# Patient Record
Sex: FEMALE | Race: WHITE
[De-identification: names, ages, dates, MRNs, and addresses within clinical notes are randomized per-mention and may not be internally consistent; named-entity substitution may affect disease eponyms.]

---

## 2019-07-12 ENCOUNTER — HOSPITAL ENCOUNTER (OUTPATIENT)
Dept: HOSPITAL 89 - OB | Age: 19
Setting detail: OBSERVATION
LOS: 1 days | Discharge: HOME | End: 2019-07-13
Attending: OBSTETRICS & GYNECOLOGY | Admitting: OBSTETRICS & GYNECOLOGY
Payer: SELF-PAY

## 2019-07-12 VITALS
WEIGHT: 250 LBS | HEIGHT: 62 IN | BODY MASS INDEX: 46.01 KG/M2 | BODY MASS INDEX: 46.01 KG/M2 | WEIGHT: 250 LBS | HEIGHT: 62 IN

## 2019-07-12 VITALS — DIASTOLIC BLOOD PRESSURE: 63 MMHG | SYSTOLIC BLOOD PRESSURE: 131 MMHG

## 2019-07-12 DIAGNOSIS — O36.8130: ICD-10-CM

## 2019-07-12 DIAGNOSIS — O47.03: Primary | ICD-10-CM

## 2019-07-12 DIAGNOSIS — Z3A.36: ICD-10-CM

## 2019-07-12 PROCEDURE — 96372 THER/PROPH/DIAG INJ SC/IM: CPT

## 2019-07-12 PROCEDURE — 81001 URINALYSIS AUTO W/SCOPE: CPT

## 2019-07-12 NOTE — HISTORY & PHYSICAL
History of Present Illness


Age of Patient:  18


:  2


Para or TPAL:  1


EDC per LMP:  Aug 6, 2019


EDC per U/S:  Aug 6, 2019


Estimated Gestational Age:  36.2


Chief Complaint


Abdominal pain, dizziness, decreased fetal movement.


History of Present Illness


, h/o term PLTCS due to second stage arrest in 2017.  She has an 18 month 


old daughter.  She recently moved her from [place], vague reason for moving 


here. Pt states she is living in a motel for now. Her  is working as a 


.  PT reports pregnancy has been healthy with no problems.  She is 


planning on repeat c/s.  She denies LOF, CTX or VB.





History


Patient's Blood Type:  A Positive


Rubella Status:  Immune


Group B Strep Screen:  Positive


Past Medical History:


Heart murmur


Allergies:  


Coded Allergies:  


     No Known Drug Allergies (Unverified , 19)


Social History:  


no tobacco, etoh or illicits, unemployed. Recently moved to [facility]


Home Meds


Reported Medications


Prenatal Vits W-Ca,Fe,Fa(<1MG) (PRENATAL VITAMINS) 1 Each Tablet, 1 EACH PO 


DAILY, TAB


   19





Review of Systems


Constitutional:  Weight Gain


Neurological:  Weakness, Dizziness


Eyes:  No Vision Change


Cardiovascular:  No Chest Pain, No Palpitations


Respiratory:  No Shortness of Breath, No Cough


Gastrointestinal:  No Nausea, No Vomiting; Abdominal Pain (RUQ)


Genitourinary:  No Dysuria


Psychiatric:  No Depression, No Anxiety





Exam


General Exam


Vital Signs





Vital Signs








  Date Time  Temp Pulse Resp B/P (MAP) Pulse Ox O2 Delivery O2 Flow Rate FiO2


 


19 17:30 98.3 92 22 131/63 (85) 96 Room Air  








General Apperance:  Alert/Awake/No Acute Distress


Neuro:  No Gross deficits


Eyes:  Normal Extraocular Movement & Vison


ENT:  Normal


Cardiovascular:  Regular Rate and Rhythm


Respiratory:  No Respiratory Distress, Clear to Auscultation


Abdomen:  Soft, Non-Tender, Non-Distended, Gravid - Non-Tender


Musculoskeletal:  No Weakness/Pain


Extremities:  No Cyanosis,Clubbing or Edema


Integumentary:  Skin Intact without Lesions or Rash


Psychological:  Alert & Oriented X3, Appropriate Mood & Affect





Pregnancy


Cervical Dialation:  1


Cervical Effacement (%):  50


Cervical Consistency:  Moderate


Cervical Position:  Posterior


Fetal Station:  -3


Fetal Presentation:  Vertex (by u/s)


Uterine Contraction Strength:  Mild


UC Resting Tone:  Soft





Fetus


Feeling Fetal Movement?:  Yes


Fetal Heart Tones:  135


Fetal Heart Tone Variabilty:  Moderate


FHT Accelerations:  Present


FHT Decelerations:  None


FHT Category:  I





Assessment and Plan


OB/GYN Assessment:  Stable


OB/GYN Plan:  Discharge Home Tomorrow


Problems:  


(1)  uterine contractions in third trimester, antepartum


Status:  Acute


Assessment & Plan:  Will do continuous fetal monitoring overnight due to ctx.  


Pt rec'd one dose of BMZ so far and will plan second dose tomorrow.  Pt not 


feeling ctx at this time and cervix is unchanged.  No tocolytics at this time.  


Will re-examine SVE in AM and if no change will d/c to home. 





(2) H/O: 


Status:  Chronic


Assessment & Plan:  Plan RCS at 39 weeks.  Pt will see me in office on Monday at


1430, will schedule surgery at that time. 














PETER COPELAND DO         2019 21:03

## 2019-07-13 ENCOUNTER — HOSPITAL ENCOUNTER (OUTPATIENT)
Dept: HOSPITAL 89 - LAB | Age: 19
End: 2019-07-13
Attending: OBSTETRICS & GYNECOLOGY
Payer: SELF-PAY

## 2019-07-13 VITALS — BODY MASS INDEX: 45.72 KG/M2

## 2019-07-13 DIAGNOSIS — Z3A.36: ICD-10-CM

## 2019-07-13 DIAGNOSIS — O47.03: Primary | ICD-10-CM

## 2019-07-13 NOTE — NUR
Pt here for repeat Betamethasone steroid shot. Pt affirms fetal movement, denies regular or 
painful UC's, denies signs of SROM or bleeding. Pt denies any pre-eclamptic symptoms. Denies 
any further concerns. Questions addressed.

-MIMI Ramsey RN

## 2019-07-13 NOTE — OB/GYN DISCHARGE SUMMARY
Discharge Summary


Reason for Hosp/Final Diag:  


(1)  uterine contractions in third trimester, antepartum


Status:  Acute


Hospital Course & Plan:  Ctx have spaced out overnight with IV hydration. Pt 


does not feel ctx and SVE is unchanged. PT to follow up in office in Monday for 


ob check. We will schedule her RCS at that time. 





(2) H/O: 


Status:  Chronic


Hospital Course & Plan:  Plan to repeat c/s at 39 weeks 





Lates Vital Signs





Vital Signs








  Date Time  Temp Pulse Resp B/P (MAP) Pulse Ox O2 Delivery O2 Flow Rate FiO2


 


19 17:30 98.3 92 22 131/63 (85) 96 Room Air  








Weight (Pounds):  250


Condition:  Improved


Discharge:  Home, Self Care


Home Meds


Reported Medications


Prenatal Vits W-Ca,Fe,Fa(<1MG) (PRENATAL VITAMINS) 1 Each Tablet, 1 EACH PO 


DAILY, TAB


   19


Follow up with:  [facility] [phone] (Pt has appt on Monday with Dr. Lopez at 1430)


Discharge Diet:  As Tolerates, Increase Fluid Intake


Discharge Activity:  As Tolerates











PETER LOPEZ DO         2019 10:05

## 2019-07-15 ENCOUNTER — HOSPITAL ENCOUNTER (OUTPATIENT)
Dept: HOSPITAL 89 - L&D | Age: 19
Discharge: HOME | End: 2019-07-15
Attending: OBSTETRICS & GYNECOLOGY
Payer: SELF-PAY

## 2019-07-15 VITALS — BODY MASS INDEX: 45.72 KG/M2

## 2019-07-15 DIAGNOSIS — O47.03: Primary | ICD-10-CM

## 2019-07-15 DIAGNOSIS — Z3A.36: ICD-10-CM

## 2019-07-15 PROCEDURE — 81001 URINALYSIS AUTO W/SCOPE: CPT

## 2019-07-15 PROCEDURE — 99213 OFFICE O/P EST LOW 20 MIN: CPT

## 2019-07-19 ENCOUNTER — HOSPITAL ENCOUNTER (INPATIENT)
Dept: HOSPITAL 89 - OB | Age: 19
LOS: 2 days | Discharge: HOME | End: 2019-07-21
Attending: OBSTETRICS & GYNECOLOGY | Admitting: OBSTETRICS & GYNECOLOGY
Payer: SELF-PAY

## 2019-07-19 VITALS — SYSTOLIC BLOOD PRESSURE: 109 MMHG | DIASTOLIC BLOOD PRESSURE: 84 MMHG

## 2019-07-19 VITALS — DIASTOLIC BLOOD PRESSURE: 48 MMHG | SYSTOLIC BLOOD PRESSURE: 111 MMHG

## 2019-07-19 VITALS — DIASTOLIC BLOOD PRESSURE: 51 MMHG | SYSTOLIC BLOOD PRESSURE: 107 MMHG

## 2019-07-19 VITALS — SYSTOLIC BLOOD PRESSURE: 128 MMHG | DIASTOLIC BLOOD PRESSURE: 104 MMHG

## 2019-07-19 VITALS — SYSTOLIC BLOOD PRESSURE: 115 MMHG | DIASTOLIC BLOOD PRESSURE: 91 MMHG

## 2019-07-19 VITALS — SYSTOLIC BLOOD PRESSURE: 121 MMHG | DIASTOLIC BLOOD PRESSURE: 71 MMHG

## 2019-07-19 VITALS — SYSTOLIC BLOOD PRESSURE: 112 MMHG | DIASTOLIC BLOOD PRESSURE: 69 MMHG

## 2019-07-19 VITALS — SYSTOLIC BLOOD PRESSURE: 100 MMHG | DIASTOLIC BLOOD PRESSURE: 54 MMHG

## 2019-07-19 VITALS — SYSTOLIC BLOOD PRESSURE: 98 MMHG | DIASTOLIC BLOOD PRESSURE: 52 MMHG

## 2019-07-19 VITALS — DIASTOLIC BLOOD PRESSURE: 63 MMHG | SYSTOLIC BLOOD PRESSURE: 106 MMHG

## 2019-07-19 VITALS — SYSTOLIC BLOOD PRESSURE: 95 MMHG | DIASTOLIC BLOOD PRESSURE: 54 MMHG

## 2019-07-19 VITALS — SYSTOLIC BLOOD PRESSURE: 109 MMHG | DIASTOLIC BLOOD PRESSURE: 45 MMHG

## 2019-07-19 VITALS — SYSTOLIC BLOOD PRESSURE: 93 MMHG | DIASTOLIC BLOOD PRESSURE: 81 MMHG

## 2019-07-19 VITALS — BODY MASS INDEX: 45.72 KG/M2 | HEIGHT: 61 IN

## 2019-07-19 VITALS — SYSTOLIC BLOOD PRESSURE: 102 MMHG | DIASTOLIC BLOOD PRESSURE: 60 MMHG

## 2019-07-19 VITALS — DIASTOLIC BLOOD PRESSURE: 50 MMHG | SYSTOLIC BLOOD PRESSURE: 100 MMHG

## 2019-07-19 VITALS — DIASTOLIC BLOOD PRESSURE: 60 MMHG | SYSTOLIC BLOOD PRESSURE: 105 MMHG

## 2019-07-19 VITALS — SYSTOLIC BLOOD PRESSURE: 98 MMHG | DIASTOLIC BLOOD PRESSURE: 57 MMHG

## 2019-07-19 VITALS — DIASTOLIC BLOOD PRESSURE: 53 MMHG | SYSTOLIC BLOOD PRESSURE: 103 MMHG

## 2019-07-19 DIAGNOSIS — O34.211: Primary | ICD-10-CM

## 2019-07-19 DIAGNOSIS — E66.9: ICD-10-CM

## 2019-07-19 DIAGNOSIS — Z3A.37: ICD-10-CM

## 2019-07-19 LAB
PLATELET COUNT, AUTOMATED: 210 K/UL (ref 150–450)
PLATELET COUNT, AUTOMATED: 236 K/UL (ref 150–450)

## 2019-07-19 PROCEDURE — 82947 ASSAY GLUCOSE BLOOD QUANT: CPT

## 2019-07-19 PROCEDURE — 85025 COMPLETE CBC W/AUTO DIFF WBC: CPT

## 2019-07-19 PROCEDURE — 84520 ASSAY OF UREA NITROGEN: CPT

## 2019-07-19 PROCEDURE — 82565 ASSAY OF CREATININE: CPT

## 2019-07-19 PROCEDURE — 86900 BLOOD TYPING SEROLOGIC ABO: CPT

## 2019-07-19 PROCEDURE — 36415 COLL VENOUS BLD VENIPUNCTURE: CPT

## 2019-07-19 PROCEDURE — 86901 BLOOD TYPING SEROLOGIC RH(D): CPT

## 2019-07-19 PROCEDURE — 82310 ASSAY OF CALCIUM: CPT

## 2019-07-19 PROCEDURE — 82435 ASSAY OF BLOOD CHLORIDE: CPT

## 2019-07-19 PROCEDURE — 86850 RBC ANTIBODY SCREEN: CPT

## 2019-07-19 PROCEDURE — 84295 ASSAY OF SERUM SODIUM: CPT

## 2019-07-19 PROCEDURE — 82374 ASSAY BLOOD CARBON DIOXIDE: CPT

## 2019-07-19 PROCEDURE — 84132 ASSAY OF SERUM POTASSIUM: CPT

## 2019-07-19 RX ADMIN — DOCUSATE CALCIUM SCH MG: 240 CAPSULE, LIQUID FILLED ORAL at 22:49

## 2019-07-19 RX ADMIN — SODIUM CHLORIDE, SODIUM LACTATE, POTASSIUM CHLORIDE, CALCIUM CHLORIDE, AND DEXTROSE MONOHYDRATE PRN MLS/HR: 600; 310; 30; 20; 5 INJECTION, SOLUTION INTRAVENOUS at 22:49

## 2019-07-19 RX ADMIN — KETOROLAC TROMETHAMINE SCH MG: 30 INJECTION, SOLUTION INTRAMUSCULAR; INTRAVENOUS at 22:49

## 2019-07-19 RX ADMIN — FAMOTIDINE SCH MG: 20 TABLET, FILM COATED ORAL at 22:49

## 2019-07-19 RX ADMIN — SODIUM CHLORIDE, SODIUM LACTATE, POTASSIUM CHLORIDE, CALCIUM CHLORIDE, AND DEXTROSE MONOHYDRATE PRN MLS/HR: 600; 310; 30; 20; 5 INJECTION, SOLUTION INTRAVENOUS at 14:52

## 2019-07-19 NOTE — NUR
VSS. SPINAL REMAINS HIGH. WILL CHECK BP WITH ROLLING AND IF STABLE WILL TRANSFER TO PHASE 
II. MOC STABLE MILD BLOOD DISHARGE NOTED WITH FUNDUS MASSAGE.

## 2019-07-19 NOTE — POST OPERATIVE NOTE
Operative Note - OB/GYN


Operative Day


Date:  Jul 19, 2019





Physicians


Surgeon:  


Dr. Lopez


Assistant:  


Dr. Moyer, assistant was deemed necessary due to large BMI of patient


and lack of surgical first assist or surgical resident


Anesthesia:  


Spinal





Diagnosis


Pre-Op Diagnosis:  


IUP at 37 weeks, SROM, prior c/s x 1, declines TOLAC due to short interval


pregnancy


Post-Op Diagnosis:  


JASKARAN





Procedure


Findings:  


Viable cph female, 8lb 3 oz, normal appearing uterus, tubes and ovaries


Procedure(s):  


RLTCS


Specimen Removed:(Maybe N/A):  


Placenta, umbilical cord blood


Complications:  


none





Fluids


Estimated Blood Loss:  


600 cc





Dictated


Date OP Note Dictated:  Jul 19, 2019











PETER LOPEZ DO         Jul 19, 2019 09:28

## 2019-07-19 NOTE — HISTORY & PHYSICAL
History of Present Illness


Age of Patient:  18


:  2


Para or TPAL:  1001


Estimated Gestational Age:  37


Chief Complaint


Loss of fluid


History of Present Illness


Pt reports LOF at 500 am today.  She is having good FM, some CTX but no bleedin


g.  She is reports being in good health.  She has had one prior c/s about 18 


months ago.





History


Allergies:  


Coded Allergies:  


     No Known Drug Allergies (Unverified , 19)


Social History:  


no tobacco, etoh or illicits, unemployed. Recently moved to [place]





[facility] Meds


Reported Medications


Prenatal Vits W-Ca,Fe,Fa(<1MG) (PRENATAL VITAMINS) 1 Each Tablet, 1 EACH PO 


DAILY, TAB


   19





Review of Systems


Constitutional:  Weight Gain; No Fever


Cardiovascular:  No Chest Pain, No Palpitations


Respiratory:  No Shortness of Breath, No Cough


Gastrointestinal:  No Nausea, No Vomiting


Genitourinary:  No Dysuria


Psychiatric:  No Depression, No Anxiety





Exam


General Exam


General Apperance:  Alert/Awake/No Acute Distress


Neuro:  No Gross deficits


Eyes:  Normal Extraocular Movement & Vison


Respiratory:  No Respiratory Distress


Abdomen:  Gravid - Non-Tender


Musculoskeletal:  No Weakness/Pain


Extremities:  No Cyanosis,Clubbing or Edema


Integumentary:  Skin Intact without Lesions or Rash


Psychological:  Alert & Oriented X3, Appropriate Mood & Affect





Pregnancy


Vaginal Discharge/Fluid?:  Clear Fluid, Other (Gross pooling on sterile speculum


exam by Dr. Moyer )


Fetal Presentation:  Vertex


Uterine Contraction Strength:  Mild


UC Resting Tone:  Soft





Fetus


Fetal Heart Tone Variabilty:  Moderate


FHT Accelerations:  Present


FHT Decelerations:  None


FHT Category:  I





Assessment and Plan


OB/GYN Assessment:  Stable (Will proceed with RLTCS due to SROM. Informed 


consent obtained.  Risks, benefits and alternatives reviewed with pt. )


Problems:  


(1) SROM (spontaneous rupture of membranes)


Status:  Acute


Assessment & Plan:  Will proceed with PLTCS at this time .





(2) Previous  section complicating pregnancy


Status:  PETER Franklin DO         2019 08:27

## 2019-07-19 NOTE — OPERATIVE REPORT 1
EVENT DATE: 2019

SURGEON: Lynn Lopez DO 

ANESTHESIOLOGIST: Michell Weber CRNA

ANESTHESIA: Spinal.

ASSISTANT: Christiana Moyer MD 

The assistant was deemed necessary due to the patient's large BMI and the lack 

of a surgical first assistant or surgical resident.





PREOPERATIVE DIAGNOSIS  

Intrauterine pregnancy at 37 weeks with spontaneous rupture of membranes and 

prior  section x1.  Declines trial of labor after caesarean due to short

interval pregnancy.



POSTOPERATIVE DIAGNOSIS 

1.  Intrauterine pregnancy at 37 weeks with spontaneous rupture of membranes and

prior  section x1.  Declines trial of labor after caesarean due to short

interval pregnancy.

2.  Delivery of a viable cephalic female infant weighing 8 pounds 3 ounces.



PROCEDURE PERFORMED 

Repeat  section.



ESTIMATED BLOOD LOSS 

600 cc.



TISSUES REMOVED

Placenta and umbilical cord.



COMPLICATIONS 

None.



DISPOSITION 

Stable to recovery room. 



DESCRIPTION OF PROCEDURE 

After inform consent was obtained, the patient was taken to the operating room. 

Spinal anesthesia was administered and found to be adequate.  She was placed on 

the operating room in the supine position with a leftward tilt and then prepped 

and draped in normal sterile fashion.  Pfannenstiel skin incision was made using

the scalpel and carried down to the underlying layer of the fascia  Fascia was 

incised in the midline and extended out laterally with sharp dissection.  Kocher

clamps were then placed along the fascial edges and the fascia was tinted up.  

The underlying rectus muscles were dissected off sharpy.  The rectus muscles 

were then  in the midline and peritoneal incision was entered digitally

and then extended out laterally with sharp dissection with good visualization of

both bowel and bladder.  At this time, the Hank retractor was placed into the 

abdomen and secured.  Bladder flap was created using sharp dissection and the 

lower uterine segment was incised in a transverse curvilinear fashion.  Infant's

head was delivered atraumatically followed by the shoulders and body.  The 

infant was vigorous at birth.  Nose and mouth were bulb suctioned.  Three vessel

cord was doubly clamped and cut after 30 second delay and infant was handed off 

to awaiting nursery staff.  At this time, a segment of cord was doubly clamped 

and cut and passed off for assessment of cord blood.  The placenta was then 

delivered manually.  Uterus was exteriorized and cleared of all clots and debris

and hysterotomy incision was repaired in a running locked fashion using 0 

Vicryl.  Excellent hemostasis resulted along the hysterotomy incision.  

Irrigation was performed.  Uterus was returned back to the abdomen.  Gutters 

were cleared of any remaining clots and debris.  Hysterotomy incision was 

revisualized and noted to remain dry.  We proceeded with closure of the fascia. 

Fascia was reapproximated using #1 Vicryl.  Subcutaneous layer was dry after 

irrigation and reapproximated using a double layer of 3-0 Vicryl. The skin was 

closed using staples.  A pressure dressing was applied.  The patient tolerated 

the procedure well.  Sponge, lap and instrument counts were correct and she was 

taken to the recovery room in stable condition. 

ISABELLA

## 2019-07-19 NOTE — OB/GYN PROGRESS NOTE
OB Subjective


Progress Notes


Subjective


She is feeling very well.  Pain is well controlled.  Tolerating po, although she


did have one episode of vomiting after she drank a lot of fluid quickly.  No 


nausea or vomiting since then.  Minimal ambulation so far.  Stevens in.  No chest 


pain, shortness of breath or dizziness.





OB Objective


Physical Exam





Vital Signs








  Date Time  Temp Pulse Resp B/P (MAP) Pulse Ox O2 Delivery O2 Flow Rate FiO2


 


19 19:23      Nasal Cannula 1.0 


 


19 19:23 98.2 79 16 109/45 (66) 97   








General Appearance:  Alert/Awake/No Acute Distress


Neurological:  No Gross deficits


Eyes:  Normal Extraocular Movement & Vison


Cardiovascular:  Normal Rhythm & Peripheral Pulses, Regular Rate and Rhythm


Respiratory:  No Respiratory Distress, Clear to Auscultation


Abdomen:  Soft, Non-Tender, Non-Distended


Incision:  Clean, Dry, Intact, Dressing


Extremities:  No Cyanosis,Clubbing or Edema


Integumentary:  Skin Intact without Lesions or Rash


Psychological:  Alert & Oriented X3, Appropriate Mood & Affect


Result Diagram:  


19








Assessment and Plan


Problems:  


(1) Status post  delivery


Assessment & Plan:  POD#0 s/p RLTCD after SROM.  She is doing well.  Only 


concern is low urine output.  I suspect this is due to ADH and not diuresing, as


she had appropriate drop in Hgb after surgery and normal BMP.  Her vitals are 


stable.  Normal exam with soft abdomen.  Will monitor closely.  Will encourage 


ambulation.














CHARITY MENDOZA MD             2019 20:28

## 2019-07-19 NOTE — ANESTHESIA OB PRE-ANES EVAL
History of Present Illness


Anesthesia Start Date:  2019


Anesthesia Start Time:  08:20


OB Anesthesia Diagnosis:  spontaneous ROM, repeat c/section


Current Pregnancy Complication:  obesity


EDC:  Aug 6, 2019


:  2


Para:  1


Pain Ratin


Fetal Heart Tones:  128


Result Diagram:  


19 0808





Height (Inches):  61


Weight (Pounds):  250


BMI (kg/m2):  48





Past Medical History


Medical History:  obesity


Surgical History:  


Previous Anesthesia:  epidural


Attended Childbirth Classes?:  No


Hx Anesthesia Reactions:  No


Hx Family Anesthesia Reaction:  No


Past Pregnancy Complications:  obesity


Home Meds


Reported Medications


Prenatal Vits W-Ca,Fe,Fa(<1MG) (PRENATAL VITAMINS) 1 Each Tablet, 1 EACH PO 


DAILY, TAB


   19


Allergies:  


Coded Allergies:  


     No Known Drug Allergies (Unverified , 19)





Anesthesia OB ROS


Neurological:  No migraines/headaches, No seizures, No neuropathy, No other


ENT:  Denies Tooth caps, Denies Loose teeth, Denies Chipped teeth, Denies 


Dentures, Denies Bridges, Denies Retainers, Denies Veneers, Denies Implants, 


Denies Tongue ring, Denies Other


Pulmonary:  No asthma, No smoker (pks/day/yrs), No other


Airway Class:  lll


Cardiovascular ROS:  No edema, No arrhythmia, No other


GI ROS:  NPO


Last Solids Date:  2019


Last Solids Time:  05:00


 ROS:  No Herpes, No STD(s), No Liver Disease, No Renal Disease, No Other


Endocrine ROS:  other (m.obese)


Musculoskeletal ROS:  No low back pain, No low back injury, No scoliosis, No 


other


ASA Classification:  3





Assessment and Plan


Anesthesia Plan:  SAB


Anesthesia Stop Day:  2019


Anesthesia Stop Time:  09:20











TYLER ORTIZ CRNA                2019 09:33

## 2019-07-20 VITALS — SYSTOLIC BLOOD PRESSURE: 122 MMHG | DIASTOLIC BLOOD PRESSURE: 76 MMHG

## 2019-07-20 VITALS — SYSTOLIC BLOOD PRESSURE: 115 MMHG | DIASTOLIC BLOOD PRESSURE: 54 MMHG

## 2019-07-20 VITALS — DIASTOLIC BLOOD PRESSURE: 56 MMHG | SYSTOLIC BLOOD PRESSURE: 94 MMHG

## 2019-07-20 VITALS — SYSTOLIC BLOOD PRESSURE: 121 MMHG | DIASTOLIC BLOOD PRESSURE: 61 MMHG

## 2019-07-20 LAB — PLATELET COUNT, AUTOMATED: 175 K/UL (ref 150–450)

## 2019-07-20 RX ADMIN — FAMOTIDINE SCH MG: 20 TABLET, FILM COATED ORAL at 20:37

## 2019-07-20 RX ADMIN — KETOROLAC TROMETHAMINE SCH MG: 30 INJECTION, SOLUTION INTRAMUSCULAR; INTRAVENOUS at 04:54

## 2019-07-20 RX ADMIN — OXYCODONE AND ACETAMINOPHEN PRN TAB: 5; 325 TABLET ORAL at 13:50

## 2019-07-20 RX ADMIN — FERROUS SULFATE TAB 325 MG (65 MG ELEMENTAL FE) SCH MG: 325 (65 FE) TAB at 17:55

## 2019-07-20 RX ADMIN — IBUPROFEN SCH MG: 800 TABLET ORAL at 17:56

## 2019-07-20 RX ADMIN — OXYCODONE AND ACETAMINOPHEN PRN TAB: 5; 325 TABLET ORAL at 07:47

## 2019-07-20 RX ADMIN — DOCUSATE CALCIUM SCH MG: 240 CAPSULE, LIQUID FILLED ORAL at 20:37

## 2019-07-20 RX ADMIN — DOCUSATE CALCIUM SCH MG: 240 CAPSULE, LIQUID FILLED ORAL at 07:48

## 2019-07-20 RX ADMIN — OXYCODONE AND ACETAMINOPHEN PRN TAB: 5; 325 TABLET ORAL at 08:30

## 2019-07-20 RX ADMIN — OXYCODONE AND ACETAMINOPHEN PRN TAB: 5; 325 TABLET ORAL at 22:59

## 2019-07-20 RX ADMIN — OXYCODONE AND ACETAMINOPHEN PRN TAB: 5; 325 TABLET ORAL at 18:09

## 2019-07-20 RX ADMIN — IBUPROFEN SCH MG: 800 TABLET ORAL at 10:07

## 2019-07-20 RX ADMIN — FAMOTIDINE SCH MG: 20 TABLET, FILM COATED ORAL at 07:48

## 2019-07-20 NOTE — OB/GYN PROGRESS NOTE
OB Subjective


Progress Notes


Subjective


Doing well.  Pain controlled with oral medications.  Tolerating regular diet.  


Ambulating.  Voiding.  Normal lochia.  No preeclampsia symptoms. No 


CP/SOB/dizziness.





OB Objective


Physical Exam





Vital Signs








  Date Time  Temp Pulse Resp B/P (MAP) Pulse Ox O2 Delivery O2 Flow Rate FiO2


 


19 08:45 97.9 91 18 115/54 (74) 93 Room Air  


 


19 02:50       2.0 














Intake and Output 


 


 19





 07:03


 


Intake Total 7350 ml


 


Output Total 3140 ml


 


Balance 4210 ml


 


 


 


Intake Oral 3100 ml


 


IV Total 4250 ml


 


Output Urine Total 1625 ml


 


Emesis 700 ml


 


Estimated Blood Loss 815 ml








General Appearance:  Alert/Awake/No Acute Distress


Neurological:  No Gross deficits


Eyes:  Normal Extraocular Movement & Vison


Cardiovascular:  Normal Rhythm & Peripheral Pulses, Regular Rate and Rhythm


Respiratory:  No Respiratory Distress, Clear to Auscultation


Abdomen:  Soft, Non-Tender, Non-Distended


Incision:  Clean, Dry, Intact


Extremities:  No Cyanosis,Clubbing or Edema


Integumentary:  Skin Intact without Lesions or Rash


Psychological:  Alert & Oriented X3, Appropriate Mood & Affect


Result Diagram:  


19 0546                                                                    


           19 8391








Assessment and Plan


Problems:  


(1) Status post  delivery


Assessment & Plan:  POD#1 s/p RLTCD after SROM.  She is doing well.  Urine 


output has improved.  She has staples out that can be removed  in clinic by 


a nurse.  Continue routine postop cares.





(2) Postoperative anemia


Assessment & Plan:  Hgb 8.8.  She is asymptomatic.  She also received a lot of 


fluid yesterday which may cause some dilutional effect.  Will start FeSO4, and 


monitor for symptoms.














CHARITY MENDOZA MD             2019 09:54

## 2019-07-20 NOTE — ANESTHESIA POST EVAL NOTE
Anesthesia Post Eval Note





Hematology








Test


 7/20/19


05:46


 


White Blood Count


 10.3 k/uL


(4.5-11.0)


 


Red Blood Count


 3.31 M/uL


(4.17-5.56)  L


 


Hemoglobin


 8.8 g/dL


(12.0-16.0)  *L


 


Hematocrit


 26.8 %


(34.0-47.0)  *L


 


Mean Corpuscular Volume


 80.9 fL


(80.0-96.0)


 


Mean Corpuscular Hemoglobin


 26.5 pg


(26.0-33.0)


 


Mean Corpuscular Hemoglobin


Concent 32.7 g/dL


(32.0-36.0)


 


Red Cell Distribution Width


 16.5 %


(11.5-14.5)  H


 


Platelet Count


 175 K/uL


(150-450)


 


Mean Platelet Volume


 9.0 fL


(7.2-11.1)


 


Neutrophils (%) (Auto)


 75.8 %


(39.4-72.5)  H


 


Lymphocytes (%) (Auto)


 16.6 %


(17.6-49.6)  L


 


Monocytes (%) (Auto)


 6.6 %


(4.1-12.4)


 


Eosinophils (%) (Auto)


 0.6 %


(0.4-6.7)


 


Basophils (%) (Auto)


 0.4 %


(0.3-1.4)


 


Nucleated RBC Relative Count


(auto) 0.0 /100WBC  





 


Neutrophils # (Auto)


 7.8 K/uL


(2.0-7.4)  H


 


Lymphocytes # (Auto)


 1.7 K/uL


(1.3-3.6)


 


Monocytes # (Auto)


 0.7 K/uL


(0.3-1.0)


 


Eosinophils # (Auto)


 0.1 K/uL


(0.0-0.5)


 


Basophils # (Auto)


 0.0 K/uL


(0.0-0.1)


 


Nucleated RBC Absolute Count


(auto) 0.00 K/uL  











Chemistry








Test


 7/19/19


18:51


 


Sodium Level


 134 mmol/L


(137-145)


 


Potassium Level


 4.3 mmol/L


(3.5-5.0)


 


Chloride Level


 103 mmol/L


()


 


Carbon Dioxide Level


 26 mmol/L


(22-31)


 


Blood Urea Nitrogen


 10 mg/dl


(7-18)


 


Creatinine


 0.60 mg/dl


(0.52-1.04)


 


Glomerular Filtration Rate


Calc > 60.0 





 


Random Glucose


 96 mg/dl


()


 


Calcium Level


 8.5 mg/dl


(8.4-10.2)








Vital Signs








  Date Time  Temp Pulse Resp B/P (MAP) Pulse Ox O2 Delivery O2 Flow Rate FiO2


 


7/20/19 08:45 97.9 91 18 115/54 (74) 93 Room Air  


 


7/20/19 02:50       2.0 








Pt able to participate in Eval:  Yes


Cardiovascular Status:  Satisfactory


Respiratory Status:  Satisfactory


Pain Managment:  Satisfactory


PO Nausea/Vomiting:  Satisfactory


Temperature Management:  Satisfactory


Mental Status:  Satisfactory, Alert, Oriented X3


Post-Op Hydration Status:  Satisfactory, Tolerating PO Well, Voiding w/o 


Difficulty


Anesthesia Type:  SAB


Anesthesia Tolerance:


Post op day one after repeat cs. She has ambulated and has full senstion. 


Reports mild itching. Signs of infection are reviewed, and pt agrees to seek 


medical intervention should these or persistent HA occur. Stick site is w/o 


redness swelling or drainage.











HUGH JEFFREY CRNA              Jul 20, 2019 11:44

## 2019-07-21 VITALS — DIASTOLIC BLOOD PRESSURE: 76 MMHG | SYSTOLIC BLOOD PRESSURE: 145 MMHG

## 2019-07-21 VITALS — SYSTOLIC BLOOD PRESSURE: 147 MMHG | DIASTOLIC BLOOD PRESSURE: 76 MMHG

## 2019-07-21 RX ADMIN — IBUPROFEN SCH MG: 800 TABLET ORAL at 10:18

## 2019-07-21 RX ADMIN — DOCUSATE CALCIUM SCH MG: 240 CAPSULE, LIQUID FILLED ORAL at 08:34

## 2019-07-21 RX ADMIN — OXYCODONE AND ACETAMINOPHEN PRN TAB: 5; 325 TABLET ORAL at 11:00

## 2019-07-21 RX ADMIN — FAMOTIDINE SCH MG: 20 TABLET, FILM COATED ORAL at 08:34

## 2019-07-21 RX ADMIN — OXYCODONE AND ACETAMINOPHEN PRN TAB: 5; 325 TABLET ORAL at 04:58

## 2019-07-21 RX ADMIN — IBUPROFEN SCH MG: 800 TABLET ORAL at 02:30

## 2019-07-21 RX ADMIN — FERROUS SULFATE TAB 325 MG (65 MG ELEMENTAL FE) SCH MG: 325 (65 FE) TAB at 08:34

## 2019-07-21 NOTE — OB/GYN DISCHARGE SUMMARY
Discharge Summary


Reason for Hosp/Final Diag:  


(1) Status post  delivery


Onset Date:  ~ 2019      Status:  Acute


Hospital Course & Plan:  





Reason for Hospitalization: IUP at 37 weeks, SROM, prior c/s x 1, declines TOLAC


due to short interval


pregnancy





Procedures Performed:   Delivery


Delivery Type: C/S





Hospital Course: uncomplicated  delivery and postpartum period





Delivery Information:  see delivery note





Estimated blood loss: 600


Episiotomy: none


Laceration: n/a


Pain Management: spinal





Subjective: Pt is feeling well today and ready to go home


Abdominal pain:  mild, tolerating with Percocet and ibuprofen 


Perineal pain: none


Vaginal bleeding: minimal without clots


Flatus: yes


UTI symptoms: Denies


Feeding modality: Breast


Problems with breast feeding: none


Bowel movement: no


Ambulating: yes


Preeclampsia symptoms: denies


Nausea/vomiting: none





Objective Exam: 


Vitals: normotensive and afebrile


Breasts:  soft, non tender, nipples intact and everted, + colostrum


Abdomen: FF at the U


Perineum: intact


Lochia: not clots, normal flow


Extremities: BLE soft and nontender, -homans sign





Disposition: Patient discharged to home in medically stable condition.





No Known Allergies





Medication Instructions Given to the Patient at Discharge: 











Ibuprofen 800mg PO TID for 5-7 days as needed


Colace 240mg po BID while taking iron


Ferrous Sulfate 325 PO BID anemia


Percocet 5/325 1-2 tabs every 4-6 hours PRN pain








Follow-up Appointment:  2&6 weeks with Dr. Lopez.





Activity/Restrictions: Pelvic rest; nothing in vagina for six weeks, no lifting 


greater than 10lbs for 6 weeks





Return Precautions: Patient instructed to call the clinic or return to hospital 


for fever > 101 degree F; chills; severe nausea or vomiting; inability to 


tolerate anything by mouth for > 24 hours; increasingly severe abdominal/pelvic 


pain; foul smelling vaginal discharge; vaginal bleeding > 1 pad per hour for > 2


hours; separation, drainage, or redness of incision or laceration site. Reviewed


postpartum depression and pre-eclampsia s/s and when to seek care.











(2) Postoperative anemia


Hospital Course & Plan:  Hgb 8.8.  She is asymptomatic.  She also received a lot


of fluid on Friday which may have caused some dilutional effect.  FeSO4 started,


and monitor for symptoms.





Lates Vital Signs





Vital Signs








  Date Time  Temp Pulse Resp B/P (MAP) Pulse Ox O2 Delivery O2 Flow Rate FiO2


 


19 02:29 97.7  12 145/76 (99)    


 


19 14:00  98   92 Room Air  


 


19 02:50       2.0 








Weight (Pounds):  250


Result Diagram:  


19 0546                                                                    


           19 [id number]





Condition:  Improved


Discharge:  Home


Home Meds


Active Scripts


Docusate Calcium (DOCUSATE CALCIUM) 240 Mg Capsule, 240 MG PO BID, #60 CAPSULE 2


Refills


   Prov:LAURIE OVIEDO CNM         19


Ferrous Sulfate (FERROUS SULFATE) 325 Mg Tablet, 325 MG PO BIDBS, #60 TAB 2 


Refills


   Prov:LAURIE OVIEDO CNM         19


Oxycodone Hcl/Acetaminophen (PERCOCET 5-325 MG TABLET) 1 Each Tablet, 1 TAB PO 


Q4-6H PRN for pain, #20 TAB 0 Refills


   Prov:CHARITY MENDOZA MD         19


Ibuprofen (IBUPROFEN) 800 Mg Tablet, 1 TAB PO Q8H PRN for pain, #30 TAB 0 


Refills


   TAKE WITH FOOD EVERY 8 HOURS


   Prov:CHARITY MENDOZA MD         19


Reported Medications


Prenatal Vits W-Ca,Fe,Fa(<1MG) (PRENATAL VITAMINS) 1 Each Tablet, 1 EACH PO 


DAILY, TAB


   19


Follow up Referrals:  


OB/GYN - In Two Weeks @ [facility] with PETER LOPEZ DO





Follow up in:  2 wks PO


Discharge Diet:  As Tolerates


Discharge Activity:  No Heavy Lifting x 6 wks, No Heavy Lifting > 10lb, Pelvic 


Rest


Special Instructions:  














LAURIE OVIEDO CNM           2019 09:26